# Patient Record
(demographics unavailable — no encounter records)

---

## 2024-12-30 NOTE — PHYSICAL EXAM

## 2024-12-30 NOTE — HISTORY OF PRESENT ILLNESS
[FreeTextEntry1] : This is a 40y.o F with PMHx of pituitary adenoma who presents for annual wellness exam. Patient states that she is feeling well today. No complaints at this time.   Pt denies CP, SOB, heart palpitations, dizziness, abdominal pain, N/V/D, fever or chills.

## 2025-02-01 NOTE — HISTORY OF PRESENT ILLNESS
[FreeTextEntry1] : 1 month f/u  [de-identified] : Ms. ROGERS is a 40 year old F with PMHx of pituitary adenoma presenting for 1 month f/u. MRI breast 12/2024 showed small incidental right pleural effusion. Denies cough, sob, weight loss. Pt did have cold 1 month ago that resolved. Pt reports CXR last Friday that showed no pleural effusion. Patient feels well at this time, no acute complaints. Denies chest pain, sob, clark, dizziness, diaphoresis, palpitations, LE swelling, orthopnea, syncope, n/v, headache.

## 2025-02-01 NOTE — HEALTH RISK ASSESSMENT
[0] : 2) Feeling down, depressed, or hopeless: Not at all (0) [PHQ-2 Negative - No further assessment needed] : PHQ-2 Negative - No further assessment needed [Never] : Never [[id number]] : 0

## 2025-02-01 NOTE — ADDENDUM
[FreeTextEntry1] : This note was written by Rema Hartley on 01/31/2025 acting as medical scribe for Dr. Luanne Black. I, Dr. Luanne Black, have read and attest that all the information, medical decision making and discharge instructions within are true and accurate.

## 2025-02-01 NOTE — HISTORY OF PRESENT ILLNESS
[FreeTextEntry1] : 1 month f/u  [de-identified] : Ms. ROGERS is a 40 year old F with PMHx of pituitary adenoma presenting for 1 month f/u. MRI breast 12/2024 showed small incidental right pleural effusion. Denies cough, sob, weight loss. Pt did have cold 1 month ago that resolved. Pt reports CXR last Friday that showed no pleural effusion. Patient feels well at this time, no acute complaints. Denies chest pain, sob, clark, dizziness, diaphoresis, palpitations, LE swelling, orthopnea, syncope, n/v, headache.

## 2025-02-01 NOTE — HEALTH RISK ASSESSMENT
[0] : 2) Feeling down, depressed, or hopeless: Not at all (0) [PHQ-2 Negative - No further assessment needed] : PHQ-2 Negative - No further assessment needed [Never] : Never [[id number]] : 0

## 2025-02-01 NOTE — ADDENDUM
[FreeTextEntry1] : This note was written by Rema Hartley on 01/31/2025 acting as medical scribe for Dr. Launne Black. I, Dr. Luanne Black, have read and attest that all the information, medical decision making and discharge instructions within are true and accurate.

## 2025-03-03 NOTE — HISTORY OF PRESENT ILLNESS
[TextBox_4] : SUZANNE ROGERS is a 40 year old  F referred for pulmonary evaluation for  In 1/25 had MRI of breast told incidental finding of small pleural effusion. No history of malignany. Had CXR told negative. Referred to be definitive. Few days prior to MRI did have cough and probable mild URI. Presenlty no cough, wheezing, CP or SOB.  No prior MRI or chest imaging. Quantiferon negative in past.    Past pulmonary history. N Occupational Exposure. NP Family history of pulmonary disease. Grandfather lung cancer non smoker.  Recent travel N Pets N  No family history of breast cancer.

## 2025-03-03 NOTE — DISCUSSION/SUMMARY
[FreeTextEntry1] : Possible pleural effusion noted on MRI not visualized on chest radiograph.  If present likely a very small and likely intervention would not be warranted. Patient is clinically well and asymptomatic. No history of significant underlying disease processes to account for pleural effusion.

## 2025-03-03 NOTE — ASSESSMENT
[FreeTextEntry1] : Follow-up chest radiograph 3 to 4 months from prior. Patient will follow-up in the office for radiograph and further recommendations. Would hold off on imaging such as CT at this time on this patient. Further recommendations after review of above.

## 2025-03-10 NOTE — PHYSICAL EXAM
[Normal] : supple, no neck mass and thyroid not enlarged [Normal Supraclavicular Lymph Nodes] : normal supraclavicular lymph nodes [Normal Neck Lymph Nodes] : normal neck lymph nodes  [Normal Axillary Lymph Nodes] : normal axillary lymph nodes [Normal] : normal appearance, no rash, nodules, vesicles, ulcers, erythema [de-identified] : Groins not examined [de-identified] : See diagram

## 2025-03-10 NOTE — HISTORY OF PRESENT ILLNESS
[de-identified] : 40-year-old lady.  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  Angel with score = 30.5%.  Referred by her internist: Dr. Justice DIAZ.  Reason for consultation: Baseline breast examination.   CC: Presently, no signs or symptoms related to either breast.  No previous breast biopsies. No personal history of breast disease.   No personal history of malignancy.  No relatives with breast cancer. No family history of ovarian cancer.  Not Ashkenazi.  + Family history of malignancy: Maternal grandfather: Lung cancer. Maternal aunt: Biliary cancer.   Breast imagin2024: Annual bilateral mammogram and sonogram at [facility]: BI-RADS 3. 1.  Right breast (10:00, 9 cm FN): 1.3 x 0.2 x 1.0 cm hypoechoic nodule. 2.  Left breast (7:00, 4 cm FN): 1.1 x 0.7 x 0.3 cm hypoechoic nodule. 6-month follow-up bilateral breast ultrasound recommended.  2025: Baseline bilateral breast MRI at [employer]: BI-RADS 2.   Reproductive history: Menarche at 13.  1, para 1 (twins) at 34. Her cycles are regular. No exogenous hormones.   PMD: Dr. Justice DIAZ.  NKDA.  No pacemaker or defibrillator. No anticoagulants.  + Pituitary adenoma. Diagnosed in  in the process of evaluation for amenorrhea. Takes cabergoline. 2025 brain MRI showed no interval change in size of the microadenoma. Endocrinology: Dr. Miguelangel TUCKER.  Incidental pleural effusion on baseline breast MRI prompted a pulmonary evaluation: Dr. Je HERNANDEZ. 2025 visit: No worrisome findings identified.  + Benign skin changes (acne).   2025 dermatology follow-up identified no worrisome findings. Dermatologist: Dr. Viktoriya CAREY.  2024: Paronychia of the left hallux. Treated surgically by podiatry, Dr. Mike GRANDA, D.P.M.  Ophthalmology: Dr. Yoandy GAMING.   GYN: Dr. Sommer SQUIRES. 2024 visit was unremarkable.   Baseline colonoscopy will be between age 45 and 50.

## 2025-03-10 NOTE — ASSESSMENT
[FreeTextEntry1] : 40-year-old lady referred by her internist for baseline breast examination.  She is asymptomatic with a normal physical examination.  Tyrer-Cuzick risk score = 30.5%.  September 2024: Bilateral mammogram and sonogram at [facility]: BI-RADS 3. Prescription entered today to have bilateral breast ultrasounds presently.  January 2025 baseline breast : BI-RADS 2. Will repeat periodically, balancing her increased risk of breast cancer against the concern of cumulative gadolinium exposure.................  If no imaging abnormalities, and she remains asymptomatic with a normal BSE I have asked to see her in a year, sooner if needed.  Reviewed in detail, all questions answered.  Referring physician contacted through secure email.

## 2025-03-10 NOTE — REASON FOR VISIT
[Initial Consultation] : an initial consultation for [Other: _____] : [unfilled] [FreeTextEntry2] : Baseline breast examination, Felipeer-Henryzick risk score = 30.5%, March 2024 BI-RADS 3 breast imaging at [facility].

## 2025-03-10 NOTE — REVIEW OF SYSTEMS
[Negative] : Endocrine [FreeTextEntry7] : NKDA [de-identified] : Pituitary adenoma [FreeTextEntry1] : Increased risk of breast cancer

## 2025-03-10 NOTE — HISTORY OF PRESENT ILLNESS
[de-identified] : 40-year-old lady.  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  Angel with score = 30.5%.  Referred by her internist: Dr. Justice DIAZ.  Reason for consultation: Baseline breast examination.   CC: Presently, no signs or symptoms related to either breast.  No previous breast biopsies. No personal history of breast disease.   No personal history of malignancy.  No relatives with breast cancer. No family history of ovarian cancer.  Not Ashkenazi.  + Family history of malignancy: Maternal grandfather: Lung cancer. Maternal aunt: Biliary cancer.   Breast imagin2024: Annual bilateral mammogram and sonogram at [facility]: BI-RADS 3. 1.  Right breast (10:00, 9 cm FN): 1.3 x 0.2 x 1.0 cm hypoechoic nodule. 2.  Left breast (7:00, 4 cm FN): 1.1 x 0.7 x 0.3 cm hypoechoic nodule. 6-month follow-up bilateral breast ultrasound recommended.  2025: Baseline bilateral breast MRI at [facility]: BI-RADS 2.   Reproductive history: Menarche at 13.  1, para 1 (twins) at 34. Her cycles are regular. No exogenous hormones.   PMD: Dr. Justice DIAZ.  NKDA.  No pacemaker or defibrillator. No anticoagulants.  + Pituitary adenoma. Diagnosed in  in the process of evaluation for amenorrhea. Takes cabergoline. 2025 brain MRI showed no interval change in size of the microadenoma. Endocrinology: Dr. Miguelangel TUCKER.  Incidental pleural effusion on baseline breast MRI prompted a pulmonary evaluation: Dr. Je HERNANDEZ. 2025 visit: No worrisome findings identified.  + Benign skin changes (acne).   2025 dermatology follow-up identified no worrisome findings. Dermatologist: Dr. Viktoriya CAREY.  2024: Paronychia of the left hallux. Treated surgically by podiatry, Dr. Mike GRANDA, D.P.M.  Ophthalmology: Dr. Yoandy GAMING.   GYN: Dr. Sommer SQUIRES. 2024 visit was unremarkable.   Baseline colonoscopy will be between age 45 and 50.

## 2025-03-10 NOTE — REVIEW OF SYSTEMS
[Negative] : Endocrine [FreeTextEntry7] : NKDA [de-identified] : Pituitary adenoma [FreeTextEntry1] : Increased risk of breast cancer

## 2025-03-10 NOTE — PHYSICAL EXAM
[Normal] : supple, no neck mass and thyroid not enlarged [Normal Supraclavicular Lymph Nodes] : normal supraclavicular lymph nodes [Normal Neck Lymph Nodes] : normal neck lymph nodes  [Normal Axillary Lymph Nodes] : normal axillary lymph nodes [Normal] : normal appearance, no rash, nodules, vesicles, ulcers, erythema [de-identified] : Groins not examined [de-identified] : See diagram

## 2025-03-11 NOTE — HISTORY OF PRESENT ILLNESS
[FreeTextEntry1] : 40 y old here for f/up for hyperprolactinemia and pituitary adenoma   Last MRI brain/pituitary:  2/2023 Right-sided pituitary microadenoma proximally 0.4 cm, findings similar to 2018  Headaches: No  Vision problems: No  Breast discharge (not pregnant or nursing): less, reports return of menses  Enlargement of breasts: No    Current Medication:    cabergoline 1/2 tab x 0.5mg (0.25mg) 2 x/week   Current visit:   no recent galactorrhea  menses currently Q 24 days